# Patient Record
Sex: MALE | Race: WHITE | NOT HISPANIC OR LATINO | URBAN - METROPOLITAN AREA
[De-identification: names, ages, dates, MRNs, and addresses within clinical notes are randomized per-mention and may not be internally consistent; named-entity substitution may affect disease eponyms.]

---
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
## 2021-05-31 VITALS — BODY MASS INDEX: 19.46 KG/M2 | HEIGHT: 71 IN | WEIGHT: 139 LBS

## 2021-06-01 VITALS — HEIGHT: 71 IN | BODY MASS INDEX: 20.01 KG/M2 | WEIGHT: 142.9 LBS

## 2021-06-11 NOTE — PROGRESS NOTES
[facility] Well Child Check    ASSESSMENT & PLAN  Tristan Fontaine is a 17  y.o. 2  m.o. who has normal growth and normal development.    Diagnoses and all orders for this visit:    Encounter for routine child health examination with abnormal findings  -     Meningococcal MCV4P  -     Hearing Screening  -     Vision Screening  -     PHQ9 Depression Screen  -     HPV vaccine 9 valent 3 dose IM    Common wart    Tourette's syndrome        Return to clinic in 1 year for a Well Child Check or sooner as needed    IMMUNIZATIONS/LABS  Immunizations were reviewed and orders were placed as appropriate. and I have discussed the risks and benefits of all of the vaccine components with the patient/parents.  All questions have been answered.    REFERRALS  Dental:  Recommend routine dental care as appropriate.  Other:  No additional referrals were made at this time.    ANTICIPATORY GUIDANCE  I have reviewed age appropriate anticipatory guidance.  Social:  Friends  Parenting:  Homework  Nutrition:  Body Image  Play and Communication:  Hobbies  Health:  Self-image building  Safety:  Seat Belts  Sexuality:  Body Changes    HEALTH HISTORY  Do you have any concerns that you'd like to discuss today?: No concerns       Roomed by: LH    Refills needed? No    Do you have any forms that need to be filled out? No        Do you have any significant health concerns in your family history?: Yes: mother - high blood pressure, high cholesterol , mother arthritis  History reviewed. No pertinent family history.  Since your last visit, have there been any major changes in your family, such as a move, job change, separation, divorce, or death in the family?: No    Home  Who lives in your home?:  Mom, dad, 2 birds    Single  No children  Tobacco: none  EtOH: none  Mom - Ruddy (from [place])  Dad - Alf  No siblings  Surgeries: none   Hospitalizations: migraine age 12 ([facility])  School: [employer] (fall, 2017) - college at [employer] (ACT  score 34/36) - ? [employer] - want to be in space program and go to [place]  Hobbies: speed cubing; video games; astronomy (8 inch reflector)  Speaks English, Armenian, and Portugese    Social History     Social History Narrative     No narrative on file     Do you have any trouble with sleep?:  No    Education  What school does your child attend?:  [employer]  What grade is your child in?:  12th  How does the patient perform in school (grades, behavior, attention, homework?: does well with school     Eating  Does patient eat regular meals including fruits and vegetables?:  yes  What is the patient drinking (cow's milk, water, soda, juice, sports drinks, energy drinks, etc)?: cow's milk- 2%, water and juice  Does patient have concerns about body or appearance?:  No    Activities  Does the patient have friends?:  yes  Does the patient get at least one hour of physical activity per day?:  no  Does the patient have less than 2 hours of screen time per day (aside from homework)?:  no  What does your child do for exercise?:  nothing  Does the patient have interest/participate in community activities/volunteers/school sports?:  no    MENTAL HEALTH SCREENING  No Data Recorded  No Data Recorded    VISION/HEARING  Vision: Completed. See Results  Hearing:  Completed. See Results     Hearing Screening    125Hz 250Hz 500Hz 1000Hz 2000Hz 3000Hz 4000Hz 6000Hz 8000Hz   Right ear:   40 40 40  40     Left ear:   20 20 20  20        Visual Acuity Screening    Right eye Left eye Both eyes   Without correction:      With correction: 20/20 20/25        TB Risk Assessment:  The patient and/or parent/guardian answer positive to:  patient and/or parent/guardian answer 'no' to all screening TB questions    Dental  Is your child being seen by a dentist?  Yes      Patient Active Problem List   Diagnosis     Dermatitis     Food Intolerance     Warts       Drugs  Does the patient use tobacco/alcohol/drugs?:  no    Safety  Does the patient have  "any safety concerns (peer or home)?:  no  Does the patient use safety belts, helmets and other safety equipment?:  yes    Sex  Is the patient sexually active?:  no    MEASUREMENTS  Height:  5' 10.75\" (1.797 m)  Weight: 139 lb (63 kg)  BMI: Body mass index is 19.52 kg/(m^2).  Blood Pressure: 110/70  Blood pressure percentiles are 16 % systolic and 53 % diastolic based on NHBPEP's 4th Report. Blood pressure percentile targets: 90: 134/84, 95: 138/88, 99 + 5 mmH/101.    PHYSICAL EXAM  Physical Exam   Constitutional: He is oriented to person, place, and time. He appears well-developed and well-nourished.   HENT:   Head: Atraumatic.   Right Ear: External ear normal.   Left Ear: External ear normal.   bilateral cerumen impaction   Eyes: Conjunctivae and EOM are normal. Pupils are equal, round, and reactive to light.   Neck: Normal range of motion. Neck supple.   Cardiovascular: Normal rate, regular rhythm, normal heart sounds and intact distal pulses.    Pulmonary/Chest: Effort normal and breath sounds normal.   Abdominal: Soft. Bowel sounds are normal.   Genitourinary: Penis normal.   Musculoskeletal: Normal range of motion.   Neurological: He is alert and oriented to person, place, and time. He has normal reflexes.   Tics noted.   Skin: Skin is warm and dry.   Psychiatric: He has a normal mood and affect. His behavior is normal. Judgment and thought content normal.       "

## 2021-06-17 NOTE — PROGRESS NOTES
"Assessment/Plan:    1. Scoliosis  Mild scoliosis noted thoracic lumbar spine.  Asymptomatic otherwise.  Reassurance provided.  No further evaluation required at this time.  Notify if concerns develop.    2. Leg length discrepancy  Leg length discrepancy contributing to concerns with alignment of thoracic lumbar spine likely.  Right leg approximately 1 cm shorter than left leg on exam today.  Patient declines custom shoe, shoe insert etc.          Subjective:    Tristan Fontaine is seen today for some appearance difference in back especially when bending forward.  Lump more on the right side of spine perhaps.  No pain or tenderness.  No restriction.  Mother is noticed that his hips sit somewhat off when standing.  No specific injury or trauma noted.  No history of described scoliosis.    Single  No children  Tobacco: none  EtOH: none  Mom - Ruddy (from [place])  Dad - Alf  No siblings  Surgeries: none   Hospitalizations: migraine age 12 ([facility])  School: [employer] (fall, 2017) - college at [employer] (ACT score 34/36) - ? [employer] - want to be in space program and go to [place]  Hobbies: speed cubing; video games; astronomy (8 inch reflector)  Speaks English, Palauan, and Portugese    No past surgical history on file.     No family history on file.     No past medical history on file.     Social History   Substance Use Topics     Smoking status: Never Smoker     Smokeless tobacco: Not on file     Alcohol use Not on file        No current outpatient prescriptions on file.     No current facility-administered medications for this visit.           Objective:    Vitals:    04/04/18 1552   BP: 130/80   Patient Site: Right Arm   Patient Position: Sitting   Cuff Size: Adult Regular   Pulse: 76   Weight: 142 lb 14.4 oz (64.8 kg)   Height: 5' 11\" (1.803 m)      Body mass index is 19.93 kg/(m^2).    Alert.  No apparent distress.  Does have thoracic lumbar scoliosis, mild with right-sided paravertebral muscles " somewhat more prominent with forward flexion as well as 1 cm leg length discrepancy with the right leg shorter.  Neurologic exam otherwise nonfocal.      This note has been dictated using voice recognition software and as a result may contain minor grammatical errors and unintended word substitutions.